# Patient Record
Sex: FEMALE | Race: WHITE | NOT HISPANIC OR LATINO | URBAN - METROPOLITAN AREA
[De-identification: names, ages, dates, MRNs, and addresses within clinical notes are randomized per-mention and may not be internally consistent; named-entity substitution may affect disease eponyms.]

---

## 2023-09-01 ENCOUNTER — APPOINTMENT (OUTPATIENT)
Dept: PRIMARY CARE | Facility: CLINIC | Age: 48
End: 2023-09-01
Payer: COMMERCIAL

## 2023-09-07 ENCOUNTER — APPOINTMENT (OUTPATIENT)
Dept: PRIMARY CARE | Facility: CLINIC | Age: 48
End: 2023-09-07
Payer: COMMERCIAL

## 2023-11-20 DIAGNOSIS — A60.00 GENITAL HERPES SIMPLEX, UNSPECIFIED SITE: ICD-10-CM

## 2023-11-20 RX ORDER — VALACYCLOVIR HYDROCHLORIDE 500 MG/1
500 TABLET, FILM COATED ORAL DAILY
COMMUNITY
End: 2023-11-20 | Stop reason: SDUPTHER

## 2023-11-20 RX ORDER — VALACYCLOVIR HYDROCHLORIDE 500 MG/1
500 TABLET, FILM COATED ORAL DAILY
Qty: 90 TABLET | Refills: 1 | Status: SHIPPED | OUTPATIENT
Start: 2023-11-20

## 2023-11-20 NOTE — TELEPHONE ENCOUNTER
Patient would like refills of her medication VALACYCLOVIR sent into her pharmacy please.     Ph[facility]- [facility] in [place].

## 2024-02-22 ENCOUNTER — APPOINTMENT (OUTPATIENT)
Dept: PRIMARY CARE | Facility: CLINIC | Age: 49
End: 2024-02-22
Payer: COMMERCIAL

## 2024-02-26 ENCOUNTER — OFFICE VISIT (OUTPATIENT)
Dept: PRIMARY CARE | Facility: CLINIC | Age: 49
End: 2024-02-26
Payer: COMMERCIAL

## 2024-02-26 VITALS
WEIGHT: 202 LBS | DIASTOLIC BLOOD PRESSURE: 80 MMHG | SYSTOLIC BLOOD PRESSURE: 104 MMHG | HEIGHT: 67 IN | BODY MASS INDEX: 31.71 KG/M2 | HEART RATE: 89 BPM

## 2024-02-26 DIAGNOSIS — Z12.11 COLON CANCER SCREENING: ICD-10-CM

## 2024-02-26 DIAGNOSIS — Z00.00 HEALTHCARE MAINTENANCE: Primary | ICD-10-CM

## 2024-02-26 DIAGNOSIS — Z12.31 ENCOUNTER FOR SCREENING MAMMOGRAM FOR MALIGNANT NEOPLASM OF BREAST: ICD-10-CM

## 2024-02-26 LAB
POC APPEARANCE, URINE: CLEAR
POC BILIRUBIN, URINE: NEGATIVE
POC BLOOD, URINE: NEGATIVE
POC COLOR, URINE: YELLOW
POC GLUCOSE, URINE: NEGATIVE MG/DL
POC KETONES, URINE: NEGATIVE MG/DL
POC LEUKOCYTES, URINE: NEGATIVE
POC NITRITE,URINE: NEGATIVE
POC PH, URINE: 6 PH
POC PROTEIN, URINE: NEGATIVE MG/DL
POC SPECIFIC GRAVITY, URINE: 1.01
POC UROBILINOGEN, URINE: 0.2 EU/DL

## 2024-02-26 PROCEDURE — 1036F TOBACCO NON-USER: CPT | Performed by: STUDENT IN AN ORGANIZED HEALTH CARE EDUCATION/TRAINING PROGRAM

## 2024-02-26 PROCEDURE — 93000 ELECTROCARDIOGRAM COMPLETE: CPT | Performed by: STUDENT IN AN ORGANIZED HEALTH CARE EDUCATION/TRAINING PROGRAM

## 2024-02-26 PROCEDURE — 81002 URINALYSIS NONAUTO W/O SCOPE: CPT | Performed by: STUDENT IN AN ORGANIZED HEALTH CARE EDUCATION/TRAINING PROGRAM

## 2024-02-26 PROCEDURE — 99396 PREV VISIT EST AGE 40-64: CPT | Performed by: STUDENT IN AN ORGANIZED HEALTH CARE EDUCATION/TRAINING PROGRAM

## 2024-02-26 ASSESSMENT — PATIENT HEALTH QUESTIONNAIRE - PHQ9
1. LITTLE INTEREST OR PLEASURE IN DOING THINGS: NOT AT ALL
2. FEELING DOWN, DEPRESSED OR HOPELESS: NOT AT ALL
SUM OF ALL RESPONSES TO PHQ9 QUESTIONS 1 AND 2: 0

## 2024-02-26 NOTE — PROGRESS NOTES
"Subjective   Patient ID: Glenna Choe is a 48 y.o. female who presents for Annual Exam.  Today she is accompanied by alone.     [facility]  Health Maintenance   Overall patient is doing well.   Immunization: Tdap UTD 2021  Influenza vaccine: None this season    COVID-19 vaccine (Pfizer) UTD x2   Colon Cancer Screening: No family history, spouse was recently diagnosed with colon cancer. Patient looking to complete Cologuard this year   OB/GYN: Sees Dr. Hoover routinely, has appointment at the end of March; Pap smear up to date. Planning mammogram in the next few months   Diet: Well balanced   Exercise: Walks her dog 2x daily   Tobacco: Denies use  EtOH: Rarely Socially   Routine blood work and urinalysis     2. Hematuria   Followed up with Urology   As per Urology, microscopic hematuria has resolved   Okay to follow back in 5 years if necessary, recommended follow up annually with PCP    3. Weight Gain   Patient endorses losing 20 lbs in the past 4 years but has gained it all back recently  Is concerned about thyroid levels and menopause as a result      Current Outpatient Medications on File Prior to Visit   Medication Sig Dispense Refill    valACYclovir (Valtrex) 500 mg tablet Take 1 tablet (500 mg) by mouth once daily. 90 tablet 1     No current facility-administered medications on file prior to visit.        No Known Allergies    Immunization History   Administered Date(s) Administered    Pfizer Purple Cap SARS-CoV-2 03/23/2021, 04/13/2021    Tdap vaccine, age 7 year and older (BOOSTRIX, ADACEL) 02/18/2021         Review of Systems  All pertinent positive symptoms are included in the history of present illness.  All other systems have been reviewed and are negative and noncontributory to this patient's current ailments.     Objective   /80 (BP Location: Left arm, Patient Position: Sitting, BP Cuff Size: Adult)   Pulse 89   Ht 1.689 m (5' 6.5\")   Wt 91.6 kg (202 lb)   BMI 32.12 kg/m²   BSA: 2.07 " meters squared  Office Visit on 02/26/2024   Component Date Value Ref Range Status    POC Color, Urine 02/26/2024 Yellow  Straw, Yellow, Light-Yellow Final    POC Appearance, Urine 02/26/2024 Clear  Clear Final    POC Glucose, Urine 02/26/2024 NEGATIVE  NEGATIVE mg/dl Final    POC Bilirubin, Urine 02/26/2024 NEGATIVE  NEGATIVE Final    POC Ketones, Urine 02/26/2024 NEGATIVE  NEGATIVE mg/dl Final    POC Specific Gravity, Urine 02/26/2024 1.015  1.005 - 1.035 Final    POC Blood, Urine 02/26/2024 NEGATIVE  NEGATIVE Final    POC PH, Urine 02/26/2024 6.0  No Reference Range Established PH Final    POC Protein, Urine 02/26/2024 NEGATIVE  NEGATIVE, 30 (1+) mg/dl Final    POC Urobilinogen, Urine 02/26/2024 0.2  0.2, 1.0 EU/DL Final    Poc Nitrite, Urine 02/26/2024 NEGATIVE  NEGATIVE Final    POC Leukocytes, Urine 02/26/2024 NEGATIVE  NEGATIVE Final       Physical Exam  CONSTITUTIONAL - well nourished, well developed, looks like stated age, in no acute distress, not ill-appearing, and not tired appearing  SKIN - normal skin color and pigmentation, normal skin turgor without rash, lesions, or nodules visualized  HEAD - no trauma, normocephalic  EYES - pupils are equal and reactive to light, extraocular muscles are intact, and normal external exam  ENT - TM's intact, no injection, no signs of infection, uvula midline, normal tongue movement and throat normal, no exudate, nasal passage without discharge and patent  NECK - supple without rigidity, no neck mass was observed, no thyromegaly or thyroid nodules  CHEST - clear to auscultation, no wheezing, no crackles and no rales, good effort  CARDIAC - regular rate and regular rhythm, no murmur  ABDOMEN - no organomegaly, soft, nontender, nondistended, normal bowel sounds, no guarding/rigidity  EXTREMITIES - no edema, no deformities  NEUROLOGICAL - normal gait, normal balance, normal motor, no ataxia, DTRs equal and symmetrical; alert, oriented and no focal signs, strength 5/5  bilaterally  PSYCHIATRIC - alert, pleasant and cordial, age-appropriate  IMMUNOLOGIC - no cervical lymphadenopathy     Assessment/Plan   Health Maintenance   Complete history and physical examination performed   EKG shows no acute changes at this time   Immunizations: No immunizations administered at this visit  Continue to follow up with OB/Gyn specialist for well woman visits   Ordered mammogram and Cologuard, please complete this at your earliest convenience   Continue well-balanced diet and routine exercise  Ordered routine blood work, please complete this at your earliest convenience  We will notify of test results once available and make treatment recommendations accordingly     2. Hematuria   Urinalysis completed today   We will notify of test results once available and make treatment recommendations accordingly   Will continue to monitor for any recurrence of microhematuria     3. Weight Gain   Pending blood work to identify cause for weight gain   If thyroid levels are normal, we will discuss other contributors to weight gain, including menopause and stressors, and make treatment recommendations accordingly     Please call the office with any questions. Please follow-up in 6 months for continued care and refills

## 2024-03-08 ENCOUNTER — LAB (OUTPATIENT)
Dept: LAB | Facility: LAB | Age: 49
End: 2024-03-08
Payer: COMMERCIAL

## 2024-03-08 DIAGNOSIS — Z00.00 HEALTHCARE MAINTENANCE: ICD-10-CM

## 2024-03-08 LAB
ALBUMIN SERPL BCP-MCNC: 4.8 G/DL (ref 3.4–5)
ALP SERPL-CCNC: 50 U/L (ref 33–110)
ALT SERPL W P-5'-P-CCNC: 17 U/L (ref 7–45)
ANION GAP SERPL CALC-SCNC: 13 MMOL/L (ref 10–20)
AST SERPL W P-5'-P-CCNC: 18 U/L (ref 9–39)
BASOPHILS # BLD AUTO: 0.04 X10*3/UL (ref 0–0.1)
BASOPHILS NFR BLD AUTO: 0.6 %
BILIRUB SERPL-MCNC: 0.6 MG/DL (ref 0–1.2)
BUN SERPL-MCNC: 14 MG/DL (ref 6–23)
CALCIUM SERPL-MCNC: 9.6 MG/DL (ref 8.6–10.3)
CHLORIDE SERPL-SCNC: 105 MMOL/L (ref 98–107)
CHOLEST SERPL-MCNC: 217 MG/DL (ref 0–199)
CHOLESTEROL/HDL RATIO: 3.7
CO2 SERPL-SCNC: 22 MMOL/L (ref 21–32)
CREAT SERPL-MCNC: 0.73 MG/DL (ref 0.5–1.05)
EGFRCR SERPLBLD CKD-EPI 2021: >90 ML/MIN/1.73M*2
EOSINOPHIL # BLD AUTO: 0.1 X10*3/UL (ref 0–0.7)
EOSINOPHIL NFR BLD AUTO: 1.5 %
ERYTHROCYTE [DISTWIDTH] IN BLOOD BY AUTOMATED COUNT: 12.4 % (ref 11.5–14.5)
GLUCOSE SERPL-MCNC: 87 MG/DL (ref 74–99)
HCT VFR BLD AUTO: 46.5 % (ref 36–46)
HDLC SERPL-MCNC: 58.6 MG/DL
HGB BLD-MCNC: 15.7 G/DL (ref 12–16)
IMM GRANULOCYTES # BLD AUTO: 0.02 X10*3/UL (ref 0–0.7)
IMM GRANULOCYTES NFR BLD AUTO: 0.3 % (ref 0–0.9)
LDLC SERPL CALC-MCNC: 139 MG/DL
LYMPHOCYTES # BLD AUTO: 1.38 X10*3/UL (ref 1.2–4.8)
LYMPHOCYTES NFR BLD AUTO: 21.1 %
MCH RBC QN AUTO: 35.4 PG (ref 26–34)
MCHC RBC AUTO-ENTMCNC: 33.8 G/DL (ref 32–36)
MCV RBC AUTO: 105 FL (ref 80–100)
MONOCYTES # BLD AUTO: 0.42 X10*3/UL (ref 0.1–1)
MONOCYTES NFR BLD AUTO: 6.4 %
NEUTROPHILS # BLD AUTO: 4.58 X10*3/UL (ref 1.2–7.7)
NEUTROPHILS NFR BLD AUTO: 70.1 %
NON HDL CHOLESTEROL: 158 MG/DL (ref 0–149)
NRBC BLD-RTO: 0 /100 WBCS (ref 0–0)
PLATELET # BLD AUTO: 308 X10*3/UL (ref 150–450)
POTASSIUM SERPL-SCNC: 4.2 MMOL/L (ref 3.5–5.3)
PROT SERPL-MCNC: 7.6 G/DL (ref 6.4–8.2)
RBC # BLD AUTO: 4.44 X10*6/UL (ref 4–5.2)
SODIUM SERPL-SCNC: 136 MMOL/L (ref 136–145)
TRIGL SERPL-MCNC: 96 MG/DL (ref 0–149)
TSH SERPL-ACNC: 3.28 MIU/L (ref 0.44–3.98)
VLDL: 19 MG/DL (ref 0–40)
WBC # BLD AUTO: 6.5 X10*3/UL (ref 4.4–11.3)

## 2024-03-08 PROCEDURE — 80053 COMPREHEN METABOLIC PANEL: CPT

## 2024-03-08 PROCEDURE — 84443 ASSAY THYROID STIM HORMONE: CPT

## 2024-03-08 PROCEDURE — 80061 LIPID PANEL: CPT

## 2024-03-08 PROCEDURE — 85025 COMPLETE CBC W/AUTO DIFF WBC: CPT

## 2024-03-08 PROCEDURE — 83036 HEMOGLOBIN GLYCOSYLATED A1C: CPT

## 2024-03-08 PROCEDURE — 36415 COLL VENOUS BLD VENIPUNCTURE: CPT

## 2024-03-09 LAB
EST. AVERAGE GLUCOSE BLD GHB EST-MCNC: 126 MG/DL
HBA1C MFR BLD: 6 %

## 2024-03-10 NOTE — RESULT ENCOUNTER NOTE
Hemoglobin A1c does show prediabetes at 6.0%    Cholesterol did increase slightly up to 217, HDL 58, , triglycerides 96    Complete blood cell count shows no anemia but she does show a slight increase of hematocrit but this has overall been stable    Sugar, kidneys, liver, electrolytes are all within normal limits    Thyroid well within normal limits

## 2024-03-20 ENCOUNTER — APPOINTMENT (OUTPATIENT)
Dept: OBSTETRICS AND GYNECOLOGY | Facility: CLINIC | Age: 49
End: 2024-03-20
Payer: COMMERCIAL

## 2024-04-08 ENCOUNTER — APPOINTMENT (OUTPATIENT)
Dept: RADIOLOGY | Facility: CLINIC | Age: 49
End: 2024-04-08
Payer: COMMERCIAL

## 2024-04-10 ENCOUNTER — OFFICE VISIT (OUTPATIENT)
Dept: OBSTETRICS AND GYNECOLOGY | Facility: CLINIC | Age: 49
End: 2024-04-10
Payer: COMMERCIAL

## 2024-04-10 VITALS
HEIGHT: 66 IN | BODY MASS INDEX: 32.14 KG/M2 | SYSTOLIC BLOOD PRESSURE: 122 MMHG | WEIGHT: 200 LBS | DIASTOLIC BLOOD PRESSURE: 80 MMHG

## 2024-04-10 DIAGNOSIS — Z12.4 PAP SMEAR FOR CERVICAL CANCER SCREENING: ICD-10-CM

## 2024-04-10 DIAGNOSIS — Z11.51 SCREENING FOR HUMAN PAPILLOMAVIRUS (HPV): ICD-10-CM

## 2024-04-10 DIAGNOSIS — Z01.419 WOMEN'S ANNUAL ROUTINE GYNECOLOGICAL EXAMINATION: Primary | ICD-10-CM

## 2024-04-10 PROCEDURE — 99396 PREV VISIT EST AGE 40-64: CPT | Performed by: NURSE PRACTITIONER

## 2024-04-10 PROCEDURE — 1036F TOBACCO NON-USER: CPT | Performed by: NURSE PRACTITIONER

## 2024-04-10 PROCEDURE — 88175 CYTOPATH C/V AUTO FLUID REDO: CPT

## 2024-04-10 PROCEDURE — 87624 HPV HI-RISK TYP POOLED RSLT: CPT

## 2024-04-10 ASSESSMENT — ENCOUNTER SYMPTOMS
UNEXPECTED WEIGHT CHANGE: 1
ENDOCRINE NEGATIVE: 1
PSYCHIATRIC NEGATIVE: 1
MUSCULOSKELETAL NEGATIVE: 1
EYES NEGATIVE: 1
NEUROLOGICAL NEGATIVE: 1
RESPIRATORY NEGATIVE: 1
GASTROINTESTINAL NEGATIVE: 1
CARDIOVASCULAR NEGATIVE: 1

## 2024-04-10 NOTE — PROGRESS NOTES
Subjective   Patient ID: Glenna Choe is a 48 y.o. female who presents for Annual Exam (Normal PAP /HPV 12/09/2021/Mammogram was ordered 2/26/2024 but not yet performed. ).  48 year old here for annual exam without complaints.  She is due for her pap today as her last pap was normal with neg hpv in 2021.  She is scheduled for her mammogram next week.  She denies any abnormal bleeding, pain, discharge and urinary changes.  She has noticed an increase in weight despite eating the same and having the same exercise routine.  She has also noticed her cycle to now range from 24 to 40 days where they were 28 days exactly before.  She also has noticed some months are heavy and some months lighter, but overall the bleeding has remained the same.         Review of Systems   Constitutional:  Positive for unexpected weight change.   HENT: Negative.     Eyes: Negative.    Respiratory: Negative.     Cardiovascular: Negative.    Gastrointestinal: Negative.    Endocrine: Negative.    Genitourinary: Negative.    Musculoskeletal: Negative.    Skin: Negative.    Neurological: Negative.    Psychiatric/Behavioral: Negative.         Objective   Physical Exam  Vitals reviewed.   Constitutional:       Appearance: Normal appearance. She is well-developed.   Pulmonary:      Effort: Pulmonary effort is normal. No respiratory distress.   Chest:   Breasts:     Breasts are symmetrical.      Right: Normal. No swelling, bleeding, inverted nipple, mass, nipple discharge, skin change or tenderness.      Left: Normal. No swelling, bleeding, inverted nipple, mass, nipple discharge, skin change or tenderness.   Abdominal:      Palpations: Abdomen is soft.   Genitourinary:     General: Normal vulva.      Exam position: Lithotomy position.      Pubic Area: No rash.       Labia:         Right: No rash, tenderness, lesion or injury.         Left: No rash, tenderness, lesion or injury.       Urethra: No prolapse, urethral pain, urethral swelling or  urethral lesion.      Vagina: Normal.      Cervix: Normal.      Uterus: Normal.       Adnexa: Right adnexa normal and left adnexa normal.      Rectum: Normal.   Musculoskeletal:         General: Normal range of motion.   Lymphadenopathy:      Upper Body:      Right upper body: No supraclavicular, axillary or pectoral adenopathy.      Left upper body: No supraclavicular, axillary or pectoral adenopathy.   Skin:     General: Skin is warm and dry.   Neurological:      General: No focal deficit present.      Mental Status: She is alert and oriented to person, place, and time. Mental status is at baseline.   Psychiatric:         Attention and Perception: Attention and perception normal.         Mood and Affect: Mood and affect normal.         Speech: Speech normal.         Behavior: Behavior normal. Behavior is cooperative.         Thought Content: Thought content normal.         Judgment: Judgment normal.     Exam performed by Vidhi EATON, 2nd exam performed by student Heather XIE student [facility] upon patient agreement.      Assessment/Plan   Problem List Items Addressed This Visit             ICD-10-CM    Women's annual routine gynecological examination - Primary Z01.419     Other Visit Diagnoses         Codes    Pap smear for cervical cancer screening     Z12.4    Screening for human papillomavirus (HPV)     Z11.51          Pap/hpv sent  Mammogram ordered  Follow up 1 year or as needed       JAIMIE Christopher 04/10/24 11:41 AM

## 2024-04-18 ENCOUNTER — HOSPITAL ENCOUNTER (OUTPATIENT)
Dept: RADIOLOGY | Facility: CLINIC | Age: 49
Discharge: HOME | End: 2024-04-18
Payer: COMMERCIAL

## 2024-04-18 VITALS — HEIGHT: 66 IN | BODY MASS INDEX: 32.14 KG/M2 | WEIGHT: 200 LBS

## 2024-04-18 DIAGNOSIS — Z12.31 ENCOUNTER FOR SCREENING MAMMOGRAM FOR MALIGNANT NEOPLASM OF BREAST: ICD-10-CM

## 2024-04-18 LAB
CYTOLOGY CMNT CVX/VAG CYTO-IMP: NORMAL
HPV HR 12 DNA GENITAL QL NAA+PROBE: NEGATIVE
HPV HR GENOTYPES PNL CVX NAA+PROBE: NEGATIVE
HPV16 DNA SPEC QL NAA+PROBE: NEGATIVE
HPV18 DNA SPEC QL NAA+PROBE: NEGATIVE
LAB AP HPV GENOTYPE QUESTION: YES
LAB AP HPV HR: NORMAL
LABORATORY COMMENT REPORT: NORMAL
PATH REPORT.TOTAL CANCER: NORMAL

## 2024-04-18 PROCEDURE — 77063 BREAST TOMOSYNTHESIS BI: CPT

## 2024-04-18 PROCEDURE — 77063 BREAST TOMOSYNTHESIS BI: CPT | Performed by: RADIOLOGY

## 2024-04-18 PROCEDURE — 77067 SCR MAMMO BI INCL CAD: CPT | Performed by: RADIOLOGY

## 2024-08-06 DIAGNOSIS — Z86.39 HISTORY OF HYPERLIPIDEMIA: ICD-10-CM

## 2024-08-10 ENCOUNTER — HOSPITAL ENCOUNTER (OUTPATIENT)
Dept: RADIOLOGY | Facility: HOSPITAL | Age: 49
Discharge: HOME | End: 2024-08-10
Payer: COMMERCIAL

## 2024-08-10 DIAGNOSIS — Z86.39 HISTORY OF HYPERLIPIDEMIA: ICD-10-CM

## 2024-08-10 PROCEDURE — 75571 CT HRT W/O DYE W/CA TEST: CPT

## 2024-08-12 NOTE — RESULT ENCOUNTER NOTE
CT cardiac score shows a value of 0 which places the patient in the lowest risk stratification for any cardiovascular disease    There is also some evidence of old granulomatous/scarring disease within the lungs    There is also some scarring in the lower portions of the lungs and borderline splenomegaly    There is also borderline heart enlargement but this is not fully significant

## 2024-08-14 DIAGNOSIS — A60.00 GENITAL HERPES SIMPLEX, UNSPECIFIED SITE: ICD-10-CM

## 2024-08-14 RX ORDER — VALACYCLOVIR HYDROCHLORIDE 500 MG/1
500 TABLET, FILM COATED ORAL DAILY
Qty: 90 TABLET | Refills: 1 | Status: SHIPPED | OUTPATIENT
Start: 2024-08-14

## 2024-12-02 ENCOUNTER — TELEMEDICINE (OUTPATIENT)
Dept: PRIMARY CARE | Facility: CLINIC | Age: 49
End: 2024-12-02
Payer: COMMERCIAL

## 2024-12-02 DIAGNOSIS — R05.1 ACUTE COUGH: ICD-10-CM

## 2024-12-02 DIAGNOSIS — R50.9 FEVER, UNSPECIFIED FEVER CAUSE: ICD-10-CM

## 2024-12-02 DIAGNOSIS — J06.9 ACUTE URI: Primary | ICD-10-CM

## 2024-12-02 PROCEDURE — 1036F TOBACCO NON-USER: CPT | Performed by: STUDENT IN AN ORGANIZED HEALTH CARE EDUCATION/TRAINING PROGRAM

## 2024-12-02 PROCEDURE — 99214 OFFICE O/P EST MOD 30 MIN: CPT | Performed by: STUDENT IN AN ORGANIZED HEALTH CARE EDUCATION/TRAINING PROGRAM

## 2024-12-02 RX ORDER — AZITHROMYCIN 250 MG/1
TABLET, FILM COATED ORAL
Qty: 6 TABLET | Refills: 0 | Status: SHIPPED | OUTPATIENT
Start: 2024-12-02 | End: 2024-12-07

## 2024-12-02 RX ORDER — BENZONATATE 200 MG/1
200 CAPSULE ORAL 3 TIMES DAILY PRN
Qty: 42 CAPSULE | Refills: 0 | Status: SHIPPED | OUTPATIENT
Start: 2024-12-02 | End: 2025-01-01

## 2024-12-02 RX ORDER — METHYLPREDNISOLONE 4 MG/1
TABLET ORAL
Qty: 21 TABLET | Refills: 0 | Status: SHIPPED | OUTPATIENT
Start: 2024-12-02

## 2024-12-02 NOTE — PROGRESS NOTES
Subjective   Patient ID: Glenna Choe is a 49 y.o. female who presents for Cough.    HPI  Presents virtually today with chronic cough since Wednesday.   The cough is non-productive but has caused difficulty sleeping due to prolonged coughing episodes.      Her kids were recently home for the holidays and were sick with similar symptoms, but tested negative for COVID.   She did not do an at home COVID test.    The cough is accompanied by body aches and a fever, which has since broken.    Denies shortness of breath, chest pain, wheezing, sinus pressure and throat discomfort.  Wishes to discuss this further at this time    Current Outpatient Medications on File Prior to Visit   Medication Sig Dispense Refill    valACYclovir (Valtrex) 500 mg tablet Take 1 tablet (500 mg) by mouth once daily. 90 tablet 1     No current facility-administered medications on file prior to visit.        No Known Allergies    Immunization History   Administered Date(s) Administered    Pfizer Purple Cap SARS-CoV-2 03/23/2021, 04/13/2021    Tdap vaccine, age 7 year and older (BOOSTRIX, ADACEL) 02/18/2021         Review of Systems  All pertinent positive symptoms are included in the history of present illness.  All other systems have been reviewed and are negative and noncontributory to this patient's current ailments.     Objective   There were no vitals taken for this visit.  BSA: There is no height or weight on file to calculate BSA.  No visits with results within 1 Month(s) from this visit.   Latest known visit with results is:   Office Visit on 04/10/2024   Component Date Value Ref Range Status    Case Report 04/10/2024    Final                    Value:Gynecologic Cytology                              Case: [id number]                                   Authorizing Provider:  JAIMIE Christopher Collected:           04/10/2024 1152              Ordering Location:     [facility]      Received:            04/10/2024  1152                                     [place]                                                                       First Screen:          MARIA LUISA Waddell                                                              Specimen:    ThinPrep Liquid-Based Pap-Imaging System Screen, CERVIX, SCREENING                         Final Cytological Interpretation 04/10/2024    Final                    Value:                                                    A. THINPREP PAP CERVIX, SCREENING -                                                     Specimen Adequacy                          Satisfactory for evaluation; absence of endocervical/transformation zone                           component                                                    General Categorization                          Negative for intraepithelial lesion or malignancy.                                                    Descriptive Interpretation                          Negative for intraepithelial lesion or malignancy                                                                                    04/10/2024    Final                    Value:Slide(s) initially screened by MARIA LUISA Waddell at [address] [id number]                          By the signature on this report, the individual or group listed as making                           the Final Interpretation/Diagnosis certifies that they have reviewed this                           case.     ThinPrep Imaging System 04/10/2024    Final                    Value:This specimen has been analyzed by the ThinPrep Imaging System ([employer] an automated imaging and review system, which assists the                           laboratory in evaluating cells on ThinPrep Pap tests. Following automated                           imaging, selected fields from every slide were reviewed by a                            cytotechnologist and/or pathologist.                              Educational Note 04/10/2024    Final                    Value:Cervical cytology is a screening procedure primarily for squamous cancers                           and precursors and has associated false-negative and false-positives                           results as evidenced by published data. Your patient's test should be                           interpreted in this context, together with the patient's history and                           clinical findings. Regular sampling and follow-up of unexplained clinical                           signs and symptoms are recommended to minimize false negative results.    Perform HPV HR test? 04/10/2024 Always (all interpretations)   Final    Include HPV Genotype? 04/10/2024 Yes   Final    HPV, high-risk 04/10/2024 Negative  Negative Final    HPV Type 16 DNA 04/10/2024 Negative  Negative Final    HPV Type 18 DNA 04/10/2024 Negative  Negative Final    HPV non-Type 16 or 18 DNA 04/10/2024 Negative  Negative Final       Physical Exam    CONSTITUTIONAL - well nourished, well developed, looks like stated age, in no acute distress, appears slightly fatigued and tired  SKIN - normal skin color and pigmentation, normal skin turgor without rash, lesions, or nodules visualized  HEAD - no trauma, normocephalic  EYES - normal external exam  CHEST -no distressed breathing, good effort, coughing during the visit  EXTREMITIES - no edema, no deformities  NEUROLOGICAL - normal balance, normal motor, no ataxia  PSYCHIATRIC - alert, pleasant and cordial, age-appropriate    Assessment/Plan       1. Acute cough   Your symptoms could be due to a viral syndrome; however, with the prolonged chronic cough there is a chance this could become pneumonia.    Recommend starting Z-jerzy within the next 1-2 days if this continues to be an issue  I also provided Tessalon Perles as well as a Medrol Dosepak to start at the same  time as stated above    To be fully thorough, I did order chest x-ray to rule out any walking pneumonia as but it was decided together that we will wait to see on how you are doing if/when you start taking any medication with these next few days    If you are not getting better, I would recommend get this x-ray done before the weekend    At any point if you notice worsening or acute signs/symptoms I would recommend you go to the emergency room immediately

## 2024-12-30 ENCOUNTER — LAB (OUTPATIENT)
Dept: LAB | Facility: LAB | Age: 49
End: 2024-12-30
Payer: COMMERCIAL

## 2024-12-30 ENCOUNTER — OFFICE VISIT (OUTPATIENT)
Dept: PRIMARY CARE | Facility: CLINIC | Age: 49
End: 2024-12-30
Payer: COMMERCIAL

## 2024-12-30 VITALS
BODY MASS INDEX: 33.11 KG/M2 | HEART RATE: 84 BPM | WEIGHT: 206 LBS | HEIGHT: 66 IN | SYSTOLIC BLOOD PRESSURE: 124 MMHG | DIASTOLIC BLOOD PRESSURE: 84 MMHG

## 2024-12-30 DIAGNOSIS — N39.0 URINARY TRACT INFECTION WITHOUT HEMATURIA, SITE UNSPECIFIED: Primary | ICD-10-CM

## 2024-12-30 DIAGNOSIS — N39.0 URINARY TRACT INFECTION WITH HEMATURIA, SITE UNSPECIFIED: ICD-10-CM

## 2024-12-30 DIAGNOSIS — R31.9 URINARY TRACT INFECTION WITH HEMATURIA, SITE UNSPECIFIED: ICD-10-CM

## 2024-12-30 LAB
POC APPEARANCE, URINE: ABNORMAL
POC BILIRUBIN, URINE: NEGATIVE
POC BLOOD, URINE: ABNORMAL
POC COLOR, URINE: YELLOW
POC GLUCOSE, URINE: NEGATIVE MG/DL
POC KETONES, URINE: NEGATIVE MG/DL
POC LEUKOCYTES, URINE: ABNORMAL
POC NITRITE,URINE: POSITIVE
POC PH, URINE: 6 PH
POC PROTEIN, URINE: NEGATIVE MG/DL
POC SPECIFIC GRAVITY, URINE: 1.01
POC UROBILINOGEN, URINE: 1 EU/DL

## 2024-12-30 PROCEDURE — 3008F BODY MASS INDEX DOCD: CPT | Performed by: STUDENT IN AN ORGANIZED HEALTH CARE EDUCATION/TRAINING PROGRAM

## 2024-12-30 PROCEDURE — 81002 URINALYSIS NONAUTO W/O SCOPE: CPT | Performed by: STUDENT IN AN ORGANIZED HEALTH CARE EDUCATION/TRAINING PROGRAM

## 2024-12-30 PROCEDURE — 87086 URINE CULTURE/COLONY COUNT: CPT

## 2024-12-30 PROCEDURE — 87186 SC STD MICRODIL/AGAR DIL: CPT

## 2024-12-30 PROCEDURE — 99213 OFFICE O/P EST LOW 20 MIN: CPT | Performed by: STUDENT IN AN ORGANIZED HEALTH CARE EDUCATION/TRAINING PROGRAM

## 2024-12-30 RX ORDER — NITROFURANTOIN 25; 75 MG/1; MG/1
100 CAPSULE ORAL 2 TIMES DAILY
Qty: 14 CAPSULE | Refills: 0 | Status: SHIPPED | OUTPATIENT
Start: 2024-12-30 | End: 2025-01-06

## 2024-12-30 ASSESSMENT — ENCOUNTER SYMPTOMS
VOMITING: 0
SWEATS: 0
CHILLS: 0
FREQUENCY: 0
HEMATURIA: 0
DYSURIA: 1
NAUSEA: 0
FLANK PAIN: 0

## 2024-12-30 NOTE — PROGRESS NOTES
Subjective   Patient ID: Glenna Choe is a 49 y.o. female who presents for Dysuria and Bladder Pressure.    Difficulty Urinating   This is a new problem. The current episode started yesterday. The problem occurs every urination. The problem has been unchanged. The quality of the pain is described as burning. The pain is at a severity of 2/10. The pain is mild. There has been no fever. Pertinent negatives include no chills, discharge, flank pain, frequency, hematuria, hesitancy, nausea, possible pregnancy, sweats, urgency or vomiting.      Presents with dysuria and bladder pressure for the last 24 hours.    Denies any fevers, visible blood in the urine, vaginal discharge, vaginal redness, and new sexual partners.     States in the past antibiotics have helped her symptoms.    Currently not taking any OTC medications.    Review of Systems   Constitutional:  Negative for chills.   Gastrointestinal:  Negative for nausea and vomiting.   Genitourinary:  Positive for dysuria. Negative for flank pain, frequency, hematuria, hesitancy and urgency.     All pertinent positive symptoms are included in the history of present illness.  All other systems have been reviewed and are negative and noncontributory to this patient's current ailments.    Objective   There were no vitals taken for this visit.    Physical Exam  CONSTITUTIONAL - well nourished, well developed, looks like stated age, in no acute distress, not ill-appearing, and not tired appearing  SKIN - normal skin color and pigmentation, normal skin turgor without rash, lesions, or nodules visualized  HEAD - no trauma, normocephalic  CHEST - clear to auscultation, no wheezing, no crackles and no rales, good effort  CARDIAC - regular rate and regular rhythm, no skipped beats, no murmur  ABDOMEN - no organomegaly, soft, nontender, nondistended, normal bowel sounds, no guarding/rebound/rigidity  EXTREMITIES - no edema, no deformities  PSYCHIATRIC - alert, pleasant  and cordial, age-appropriate    Assessment/Plan   Reviewed UA results which showed positive blood, nitrates, and leukocytes, and discussed results with the patient.    Urine Culture sent.    Discussed treatment options and opted to use Macrobid BID x7 days.    If symptoms fail to resolve after completion of the antibiotics, please follow-up with us in the office.    If symptoms become too severe over the New Year's holiday, go the ER.

## 2025-01-02 DIAGNOSIS — R31.9 URINARY TRACT INFECTION WITH HEMATURIA, SITE UNSPECIFIED: Primary | ICD-10-CM

## 2025-01-02 DIAGNOSIS — N39.0 URINARY TRACT INFECTION WITH HEMATURIA, SITE UNSPECIFIED: Primary | ICD-10-CM

## 2025-01-02 LAB — BACTERIA UR CULT: ABNORMAL

## 2025-01-02 RX ORDER — CIPROFLOXACIN 500 MG/1
500 TABLET ORAL 2 TIMES DAILY
Qty: 14 TABLET | Refills: 0 | Status: SHIPPED | OUTPATIENT
Start: 2025-01-02 | End: 2025-01-09

## 2025-01-02 NOTE — RESULT ENCOUNTER NOTE
Urine culture showed positivity to Klebsiella pneumoniae that showed resistance to a few medications and also intermediate to Macrobid    Due to this, I would like to place the patient on ciprofloxacin 500 mg twice daily for the next 7 days    I would recommend she discontinues the Macrobid and switch antibiotics    This was sent to her pharmacy

## 2025-01-08 DIAGNOSIS — R39.9 UTI SYMPTOMS: ICD-10-CM

## 2025-01-09 ENCOUNTER — LAB (OUTPATIENT)
Dept: LAB | Facility: LAB | Age: 50
End: 2025-01-09
Payer: COMMERCIAL

## 2025-01-09 DIAGNOSIS — R39.9 UTI SYMPTOMS: ICD-10-CM

## 2025-01-09 LAB
APPEARANCE UR: CLEAR
BILIRUB UR STRIP.AUTO-MCNC: NEGATIVE MG/DL
COLOR UR: ABNORMAL
GLUCOSE UR STRIP.AUTO-MCNC: NORMAL MG/DL
HYALINE CASTS #/AREA URNS AUTO: ABNORMAL /LPF
KETONES UR STRIP.AUTO-MCNC: NEGATIVE MG/DL
LEUKOCYTE ESTERASE UR QL STRIP.AUTO: NEGATIVE
MUCOUS THREADS #/AREA URNS AUTO: ABNORMAL /LPF
NITRITE UR QL STRIP.AUTO: NEGATIVE
PH UR STRIP.AUTO: 5.5 [PH]
PROT UR STRIP.AUTO-MCNC: NEGATIVE MG/DL
RBC # UR STRIP.AUTO: ABNORMAL /UL
RBC #/AREA URNS AUTO: ABNORMAL /HPF
SP GR UR STRIP.AUTO: 1.03
SQUAMOUS #/AREA URNS AUTO: ABNORMAL /HPF
UROBILINOGEN UR STRIP.AUTO-MCNC: NORMAL MG/DL
WBC #/AREA URNS AUTO: ABNORMAL /HPF

## 2025-01-09 PROCEDURE — 81001 URINALYSIS AUTO W/SCOPE: CPT

## 2025-01-09 PROCEDURE — 87086 URINE CULTURE/COLONY COUNT: CPT

## 2025-01-10 LAB — BACTERIA UR CULT: NORMAL

## 2025-01-10 NOTE — RESULT ENCOUNTER NOTE
Urinalysis does show positivity to blood but otherwise within normal limits without any bacteria noted    Will still wait for the urine culture at this time

## 2025-04-15 ENCOUNTER — APPOINTMENT (OUTPATIENT)
Dept: OBSTETRICS AND GYNECOLOGY | Facility: CLINIC | Age: 50
End: 2025-04-15
Payer: COMMERCIAL

## 2025-06-05 ENCOUNTER — APPOINTMENT (OUTPATIENT)
Dept: PRIMARY CARE | Facility: CLINIC | Age: 50
End: 2025-06-05
Payer: COMMERCIAL

## 2025-06-09 ENCOUNTER — PATIENT MESSAGE (OUTPATIENT)
Dept: OBSTETRICS AND GYNECOLOGY | Facility: CLINIC | Age: 50
End: 2025-06-09
Payer: COMMERCIAL

## 2025-06-09 DIAGNOSIS — A60.00 GENITAL HERPES SIMPLEX, UNSPECIFIED SITE: ICD-10-CM

## 2025-06-09 RX ORDER — VALACYCLOVIR HYDROCHLORIDE 500 MG/1
500 TABLET, FILM COATED ORAL DAILY
Qty: 90 TABLET | Refills: 1 | Status: SHIPPED | OUTPATIENT
Start: 2025-06-09

## 2025-11-11 ENCOUNTER — APPOINTMENT (OUTPATIENT)
Dept: PRIMARY CARE | Facility: CLINIC | Age: 50
End: 2025-11-11
Payer: COMMERCIAL

## 2025-11-12 ENCOUNTER — APPOINTMENT (OUTPATIENT)
Dept: OBSTETRICS AND GYNECOLOGY | Facility: CLINIC | Age: 50
End: 2025-11-12
Payer: COMMERCIAL